# Patient Record
Sex: MALE | Race: WHITE | NOT HISPANIC OR LATINO | URBAN - METROPOLITAN AREA
[De-identification: names, ages, dates, MRNs, and addresses within clinical notes are randomized per-mention and may not be internally consistent; named-entity substitution may affect disease eponyms.]

---

## 2017-03-03 ENCOUNTER — COMMUNICATION - HEALTHEAST (OUTPATIENT)
Dept: FAMILY MEDICINE | Facility: CLINIC | Age: 42
End: 2017-03-03

## 2017-03-03 DIAGNOSIS — F43.23 ADJUSTMENT REACTION WITH ANXIETY AND DEPRESSION: ICD-10-CM

## 2017-03-27 ENCOUNTER — OFFICE VISIT - HEALTHEAST (OUTPATIENT)
Dept: FAMILY MEDICINE | Facility: CLINIC | Age: 42
End: 2017-03-27

## 2017-03-27 DIAGNOSIS — J20.9 BRONCHITIS WITH BRONCHOSPASM: ICD-10-CM

## 2017-03-27 ASSESSMENT — MIFFLIN-ST. JEOR: SCORE: 1685.04

## 2017-05-02 ENCOUNTER — COMMUNICATION - HEALTHEAST (OUTPATIENT)
Dept: FAMILY MEDICINE | Facility: CLINIC | Age: 42
End: 2017-05-02

## 2017-05-02 DIAGNOSIS — M16.0 OSTEOARTHRITIS, HIP, BILATERAL: ICD-10-CM

## 2017-07-06 ENCOUNTER — COMMUNICATION - HEALTHEAST (OUTPATIENT)
Dept: FAMILY MEDICINE | Facility: CLINIC | Age: 42
End: 2017-07-06

## 2017-07-06 DIAGNOSIS — F43.23 ADJUSTMENT REACTION WITH ANXIETY AND DEPRESSION: ICD-10-CM

## 2017-08-17 ENCOUNTER — OFFICE VISIT - HEALTHEAST (OUTPATIENT)
Dept: FAMILY MEDICINE | Facility: CLINIC | Age: 42
End: 2017-08-17

## 2017-08-17 DIAGNOSIS — F43.23 ADJUSTMENT REACTION WITH ANXIETY AND DEPRESSION: ICD-10-CM

## 2017-10-23 ENCOUNTER — COMMUNICATION - HEALTHEAST (OUTPATIENT)
Dept: FAMILY MEDICINE | Facility: CLINIC | Age: 42
End: 2017-10-23

## 2017-10-23 DIAGNOSIS — F43.23 ADJUSTMENT REACTION WITH ANXIETY AND DEPRESSION: ICD-10-CM

## 2018-04-03 ENCOUNTER — OFFICE VISIT - HEALTHEAST (OUTPATIENT)
Dept: FAMILY MEDICINE | Facility: CLINIC | Age: 43
End: 2018-04-03

## 2018-04-03 ENCOUNTER — COMMUNICATION - HEALTHEAST (OUTPATIENT)
Dept: FAMILY MEDICINE | Facility: CLINIC | Age: 43
End: 2018-04-03

## 2018-04-03 DIAGNOSIS — J01.90 ACUTE SINUSITIS, RECURRENCE NOT SPECIFIED, UNSPECIFIED LOCATION: ICD-10-CM

## 2018-04-11 ENCOUNTER — OFFICE VISIT - HEALTHEAST (OUTPATIENT)
Dept: FAMILY MEDICINE | Facility: CLINIC | Age: 43
End: 2018-04-11

## 2018-04-11 DIAGNOSIS — M16.0 OSTEOARTHRITIS OF BOTH HIPS, UNSPECIFIED OSTEOARTHRITIS TYPE: ICD-10-CM

## 2018-04-11 DIAGNOSIS — Z13.21 SCREENING FOR ENDOCRINE, NUTRITIONAL, METABOLIC AND IMMUNITY DISORDER: ICD-10-CM

## 2018-04-11 DIAGNOSIS — Z13.228 SCREENING FOR ENDOCRINE, NUTRITIONAL, METABOLIC AND IMMUNITY DISORDER: ICD-10-CM

## 2018-04-11 DIAGNOSIS — Z13.29 SCREENING FOR ENDOCRINE, NUTRITIONAL, METABOLIC AND IMMUNITY DISORDER: ICD-10-CM

## 2018-04-11 DIAGNOSIS — Z13.0 SCREENING FOR ENDOCRINE, NUTRITIONAL, METABOLIC AND IMMUNITY DISORDER: ICD-10-CM

## 2018-04-11 DIAGNOSIS — Z00.00 VISIT FOR PREVENTIVE HEALTH EXAMINATION: ICD-10-CM

## 2018-04-11 LAB
ALBUMIN SERPL-MCNC: 4.1 G/DL (ref 3.5–5)
ALP SERPL-CCNC: 72 U/L (ref 45–120)
ALT SERPL W P-5'-P-CCNC: 21 U/L (ref 0–45)
ANION GAP SERPL CALCULATED.3IONS-SCNC: 14 MMOL/L (ref 5–18)
AST SERPL W P-5'-P-CCNC: 21 U/L (ref 0–40)
BILIRUB SERPL-MCNC: 1.6 MG/DL (ref 0–1)
BUN SERPL-MCNC: 14 MG/DL (ref 8–22)
CALCIUM SERPL-MCNC: 10 MG/DL (ref 8.5–10.5)
CHLORIDE BLD-SCNC: 104 MMOL/L (ref 98–107)
CHOLEST SERPL-MCNC: 189 MG/DL
CO2 SERPL-SCNC: 24 MMOL/L (ref 22–31)
CREAT SERPL-MCNC: 0.78 MG/DL (ref 0.7–1.3)
FASTING STATUS PATIENT QL REPORTED: YES
GFR SERPL CREATININE-BSD FRML MDRD: >60 ML/MIN/1.73M2
GLUCOSE BLD-MCNC: 81 MG/DL (ref 70–125)
HDLC SERPL-MCNC: 49 MG/DL
LDLC SERPL CALC-MCNC: 124 MG/DL
POTASSIUM BLD-SCNC: 4.6 MMOL/L (ref 3.5–5)
PROT SERPL-MCNC: 7.6 G/DL (ref 6–8)
SODIUM SERPL-SCNC: 142 MMOL/L (ref 136–145)
TRIGL SERPL-MCNC: 79 MG/DL
TSH SERPL DL<=0.005 MIU/L-ACNC: 1.57 UIU/ML (ref 0.3–5)

## 2018-04-11 ASSESSMENT — MIFFLIN-ST. JEOR: SCORE: 1689.58

## 2018-04-12 ENCOUNTER — COMMUNICATION - HEALTHEAST (OUTPATIENT)
Dept: FAMILY MEDICINE | Facility: CLINIC | Age: 43
End: 2018-04-12

## 2018-04-12 LAB
25(OH)D3 SERPL-MCNC: 18.6 NG/ML (ref 30–80)
25(OH)D3 SERPL-MCNC: 18.6 NG/ML (ref 30–80)

## 2018-04-13 LAB
A ALTERNATA IGE QN: <0.35 KU/L
A FUMIGATUS IGE QN: <0.35 KU/L
C HERBARUM IGE QN: <0.35 KU/L
CAT DANDER IGG QN: <0.35 KU/L
COCKSFOOT IGE QN: <0.35 KU/L
COMMON RAGWEED IGE QN: <0.35 KU/L
COTTONWOOD IGE QN: <0.35 KU/L
D FARINAE IGE QN: <0.35 KU/L
D PTERONYSS IGE QN: <0.35 KU/L
DOG DANDER+EPITH IGE QN: <0.35 KU/L
KENT BLUE GRASS IGE QN: <0.35 KU/L
MAPLE IGE QN: <0.35 KU/L
ROACH IGE QN: <0.35 KU/L
SILVER BIRCH IGE QN: <0.35 KU/L
TIMOTHY IGE QN: <0.35 KU/L
TOTAL IGE - HISTORICAL: 18.1 KU/L (ref 0–100)
WHITE ASH IGE QN: <0.35 KU/L
WHITE ELM IGE QN: <0.35 KU/L
WHITE OAK IGE QN: <0.35 KU/L

## 2018-04-16 ENCOUNTER — AMBULATORY - HEALTHEAST (OUTPATIENT)
Dept: FAMILY MEDICINE | Facility: CLINIC | Age: 43
End: 2018-04-16

## 2018-04-16 ENCOUNTER — COMMUNICATION - HEALTHEAST (OUTPATIENT)
Dept: FAMILY MEDICINE | Facility: CLINIC | Age: 43
End: 2018-04-16

## 2018-04-16 DIAGNOSIS — J06.9 RECURRENT URI (UPPER RESPIRATORY INFECTION): ICD-10-CM

## 2018-04-16 DIAGNOSIS — Z91.09 ENVIRONMENTAL ALLERGIES: ICD-10-CM

## 2018-04-23 ENCOUNTER — OFFICE VISIT - HEALTHEAST (OUTPATIENT)
Dept: ALLERGY | Facility: CLINIC | Age: 43
End: 2018-04-23

## 2018-04-23 DIAGNOSIS — R09.81 NASAL CONGESTION: ICD-10-CM

## 2018-04-23 DIAGNOSIS — J32.9 RECURRENT SINUS INFECTIONS: ICD-10-CM

## 2018-05-22 ENCOUNTER — OFFICE VISIT - HEALTHEAST (OUTPATIENT)
Dept: OTOLARYNGOLOGY | Facility: CLINIC | Age: 43
End: 2018-05-22

## 2018-05-22 DIAGNOSIS — J32.9 CHRONIC SINUSITIS, UNSPECIFIED LOCATION: ICD-10-CM

## 2018-05-22 DIAGNOSIS — G50.1 ATYPICAL FACIAL PAIN: ICD-10-CM

## 2018-05-24 ENCOUNTER — HOSPITAL ENCOUNTER (OUTPATIENT)
Dept: CT IMAGING | Facility: CLINIC | Age: 43
Discharge: HOME | End: 2018-05-24
Attending: OTOLARYNGOLOGY

## 2018-05-24 DIAGNOSIS — J32.9 CHRONIC SINUSITIS, UNSPECIFIED LOCATION: ICD-10-CM

## 2018-05-29 ENCOUNTER — COMMUNICATION - HEALTHEAST (OUTPATIENT)
Dept: OTOLARYNGOLOGY | Facility: CLINIC | Age: 43
End: 2018-05-29

## 2018-08-27 ENCOUNTER — COMMUNICATION - HEALTHEAST (OUTPATIENT)
Dept: FAMILY MEDICINE | Facility: CLINIC | Age: 43
End: 2018-08-27

## 2018-09-05 ENCOUNTER — RECORDS - HEALTHEAST (OUTPATIENT)
Dept: ADMINISTRATIVE | Facility: OTHER | Age: 43
End: 2018-09-05

## 2018-09-12 ENCOUNTER — COMMUNICATION - HEALTHEAST (OUTPATIENT)
Dept: FAMILY MEDICINE | Facility: CLINIC | Age: 43
End: 2018-09-12

## 2018-10-04 ENCOUNTER — OFFICE VISIT - HEALTHEAST (OUTPATIENT)
Dept: FAMILY MEDICINE | Facility: CLINIC | Age: 43
End: 2018-10-04

## 2018-10-04 DIAGNOSIS — M16.0 OSTEOARTHRITIS OF BOTH HIPS, UNSPECIFIED OSTEOARTHRITIS TYPE: ICD-10-CM

## 2018-10-04 ASSESSMENT — MIFFLIN-ST. JEOR: SCORE: 1762.15

## 2018-11-07 ENCOUNTER — RECORDS - HEALTHEAST (OUTPATIENT)
Dept: ADMINISTRATIVE | Facility: OTHER | Age: 43
End: 2018-11-07

## 2018-11-20 ENCOUNTER — COMMUNICATION - HEALTHEAST (OUTPATIENT)
Dept: FAMILY MEDICINE | Facility: CLINIC | Age: 43
End: 2018-11-20

## 2018-12-06 ENCOUNTER — OFFICE VISIT - HEALTHEAST (OUTPATIENT)
Dept: FAMILY MEDICINE | Facility: CLINIC | Age: 43
End: 2018-12-06

## 2018-12-06 DIAGNOSIS — Z01.818 PREOPERATIVE EVALUATION TO RULE OUT SURGICAL CONTRAINDICATION: ICD-10-CM

## 2018-12-06 DIAGNOSIS — M16.0 OSTEOARTHRITIS OF BOTH HIPS, UNSPECIFIED OSTEOARTHRITIS TYPE: ICD-10-CM

## 2018-12-06 LAB
ALBUMIN SERPL-MCNC: 4.4 G/DL (ref 3.5–5)
ALP SERPL-CCNC: 68 U/L (ref 45–120)
ALT SERPL W P-5'-P-CCNC: 17 U/L (ref 0–45)
ANION GAP SERPL CALCULATED.3IONS-SCNC: 14 MMOL/L (ref 5–18)
AST SERPL W P-5'-P-CCNC: 15 U/L (ref 0–40)
BILIRUB SERPL-MCNC: 2.1 MG/DL (ref 0–1)
BUN SERPL-MCNC: 16 MG/DL (ref 8–22)
CALCIUM SERPL-MCNC: 10.4 MG/DL (ref 8.5–10.5)
CHLORIDE BLD-SCNC: 104 MMOL/L (ref 98–107)
CO2 SERPL-SCNC: 25 MMOL/L (ref 22–31)
CREAT SERPL-MCNC: 0.78 MG/DL (ref 0.7–1.3)
ERYTHROCYTE [DISTWIDTH] IN BLOOD BY AUTOMATED COUNT: 11.7 % (ref 11–14.5)
GFR SERPL CREATININE-BSD FRML MDRD: >60 ML/MIN/1.73M2
GLUCOSE BLD-MCNC: 90 MG/DL (ref 70–125)
HCT VFR BLD AUTO: 47.4 % (ref 40–54)
HGB BLD-MCNC: 15.7 G/DL (ref 14–18)
MCH RBC QN AUTO: 30.9 PG (ref 27–34)
MCHC RBC AUTO-ENTMCNC: 33.1 G/DL (ref 32–36)
MCV RBC AUTO: 93 FL (ref 80–100)
PLATELET # BLD AUTO: 256 THOU/UL (ref 140–440)
PMV BLD AUTO: 7.3 FL (ref 7–10)
POTASSIUM BLD-SCNC: 4.8 MMOL/L (ref 3.5–5)
PROT SERPL-MCNC: 7.4 G/DL (ref 6–8)
RBC # BLD AUTO: 5.08 MILL/UL (ref 4.4–6.2)
SODIUM SERPL-SCNC: 143 MMOL/L (ref 136–145)
WBC: 4.9 THOU/UL (ref 4–11)

## 2018-12-06 ASSESSMENT — MIFFLIN-ST. JEOR: SCORE: 1675.97

## 2019-01-02 ENCOUNTER — RECORDS - HEALTHEAST (OUTPATIENT)
Dept: ADMINISTRATIVE | Facility: OTHER | Age: 44
End: 2019-01-02

## 2019-01-14 ENCOUNTER — OFFICE VISIT - HEALTHEAST (OUTPATIENT)
Dept: FAMILY MEDICINE | Facility: CLINIC | Age: 44
End: 2019-01-14

## 2019-01-14 DIAGNOSIS — Z01.818 PREOPERATIVE EXAMINATION: ICD-10-CM

## 2019-01-14 DIAGNOSIS — M16.0 OSTEOARTHRITIS OF BOTH HIPS, UNSPECIFIED OSTEOARTHRITIS TYPE: ICD-10-CM

## 2019-01-14 LAB
ALBUMIN SERPL-MCNC: 4.2 G/DL (ref 3.5–5)
ALP SERPL-CCNC: 76 U/L (ref 45–120)
ALT SERPL W P-5'-P-CCNC: 20 U/L (ref 0–45)
ANION GAP SERPL CALCULATED.3IONS-SCNC: 8 MMOL/L (ref 5–18)
AST SERPL W P-5'-P-CCNC: 15 U/L (ref 0–40)
BASOPHILS # BLD AUTO: 0 THOU/UL (ref 0–0.2)
BASOPHILS NFR BLD AUTO: 1 % (ref 0–2)
BILIRUB SERPL-MCNC: 1.3 MG/DL (ref 0–1)
BUN SERPL-MCNC: 18 MG/DL (ref 8–22)
CALCIUM SERPL-MCNC: 9.4 MG/DL (ref 8.5–10.5)
CHLORIDE BLD-SCNC: 100 MMOL/L (ref 98–107)
CO2 SERPL-SCNC: 31 MMOL/L (ref 22–31)
CREAT SERPL-MCNC: 0.78 MG/DL (ref 0.7–1.3)
EOSINOPHIL # BLD AUTO: 0.2 THOU/UL (ref 0–0.4)
EOSINOPHIL NFR BLD AUTO: 2 % (ref 0–6)
ERYTHROCYTE [DISTWIDTH] IN BLOOD BY AUTOMATED COUNT: 12.2 % (ref 11–14.5)
GFR SERPL CREATININE-BSD FRML MDRD: >60 ML/MIN/1.73M2
GLUCOSE BLD-MCNC: 109 MG/DL (ref 70–125)
HCT VFR BLD AUTO: 40.6 % (ref 40–54)
HGB BLD-MCNC: 13.5 G/DL (ref 14–18)
LYMPHOCYTES # BLD AUTO: 1.9 THOU/UL (ref 0.8–4.4)
LYMPHOCYTES NFR BLD AUTO: 28 % (ref 20–40)
MCH RBC QN AUTO: 30.8 PG (ref 27–34)
MCHC RBC AUTO-ENTMCNC: 33.1 G/DL (ref 32–36)
MCV RBC AUTO: 93 FL (ref 80–100)
MONOCYTES # BLD AUTO: 0.4 THOU/UL (ref 0–0.9)
MONOCYTES NFR BLD AUTO: 6 % (ref 2–10)
NEUTROPHILS # BLD AUTO: 4.3 THOU/UL (ref 2–7.7)
NEUTROPHILS NFR BLD AUTO: 63 % (ref 50–70)
PLATELET # BLD AUTO: 348 THOU/UL (ref 140–440)
PMV BLD AUTO: 6.8 FL (ref 7–10)
POTASSIUM BLD-SCNC: 4.5 MMOL/L (ref 3.5–5)
PROT SERPL-MCNC: 7.2 G/DL (ref 6–8)
RBC # BLD AUTO: 4.37 MILL/UL (ref 4.4–6.2)
SODIUM SERPL-SCNC: 139 MMOL/L (ref 136–145)
WBC: 6.7 THOU/UL (ref 4–11)

## 2019-01-14 ASSESSMENT — MIFFLIN-ST. JEOR: SCORE: 1694.11

## 2019-01-29 ENCOUNTER — RECORDS - HEALTHEAST (OUTPATIENT)
Dept: ADMINISTRATIVE | Facility: OTHER | Age: 44
End: 2019-01-29

## 2019-02-12 ENCOUNTER — RECORDS - HEALTHEAST (OUTPATIENT)
Dept: ADMINISTRATIVE | Facility: OTHER | Age: 44
End: 2019-02-12

## 2019-03-13 ENCOUNTER — RECORDS - HEALTHEAST (OUTPATIENT)
Dept: ADMINISTRATIVE | Facility: OTHER | Age: 44
End: 2019-03-13

## 2019-07-04 ENCOUNTER — COMMUNICATION - HEALTHEAST (OUTPATIENT)
Dept: TELEHEALTH | Facility: CLINIC | Age: 44
End: 2019-07-04

## 2019-12-13 ENCOUNTER — RECORDS - HEALTHEAST (OUTPATIENT)
Dept: ADMINISTRATIVE | Facility: OTHER | Age: 44
End: 2019-12-13

## 2021-05-30 VITALS — WEIGHT: 168 LBS | BODY MASS INDEX: 22.75 KG/M2 | HEIGHT: 72 IN

## 2021-05-31 ENCOUNTER — RECORDS - HEALTHEAST (OUTPATIENT)
Dept: ADMINISTRATIVE | Facility: CLINIC | Age: 46
End: 2021-05-31

## 2021-06-01 VITALS — BODY MASS INDEX: 25.06 KG/M2 | WEIGHT: 185 LBS | HEIGHT: 72 IN

## 2021-06-01 VITALS — HEIGHT: 72 IN | WEIGHT: 169 LBS | BODY MASS INDEX: 22.89 KG/M2

## 2021-06-01 VITALS — BODY MASS INDEX: 23.54 KG/M2 | WEIGHT: 173.6 LBS

## 2021-06-02 VITALS — HEIGHT: 72 IN | WEIGHT: 170 LBS | BODY MASS INDEX: 23.03 KG/M2

## 2021-06-02 VITALS — WEIGHT: 166 LBS | HEIGHT: 72 IN | BODY MASS INDEX: 22.48 KG/M2

## 2021-06-09 NOTE — PROGRESS NOTES
Assessment/Plan:        Diagnoses and all orders for this visit:    Bronchitis with bronchospasm    Other orders  -     amoxicillin-clavulanate (AUGMENTIN) 500-125 mg per tablet; Take 1 tablet (500 mg total) by mouth 2 (two) times a day for 10 days.  Dispense: 20 tablet; Refill: 0  -     albuterol (PROVENTIL HFA;VENTOLIN HFA) 90 mcg/actuation inhaler; Inhale 2 puffs every 6 (six) hours as needed for wheezing.  Dispense: 1 Inhaler; Refill: 1            Subjective:    Patient ID: Andrew Benito is a 42 y.o. male.    URI    This is a new problem. The current episode started in the past 7 days. The problem has been unchanged. There has been no fever. Associated symptoms include congestion, coughing, headaches, rhinorrhea and wheezing. Pertinent negatives include no chest pain, ear pain, nausea, plugged ear sensation, rash, sinus pain, sneezing, sore throat or swollen glands. He has tried acetaminophen for the symptoms. The treatment provided no relief.       The following portions of the patient's history were reviewed and updated as appropriate: allergies, current medications, past family history, past medical history, past social history, past surgical history and problem list.    Review of Systems   Constitutional: Positive for fatigue. Negative for activity change and fever.   HENT: Positive for congestion, rhinorrhea and sinus pressure. Negative for ear discharge, ear pain, facial swelling, postnasal drip, sneezing, sore throat and trouble swallowing.    Respiratory: Positive for cough and wheezing. Negative for chest tightness.    Cardiovascular: Negative for chest pain.   Gastrointestinal: Negative for nausea.   Skin: Negative for rash.   Neurological: Positive for headaches.   All other systems reviewed and are negative.            Objective:    Physical Exam   Constitutional: He is oriented to person, place, and time. He appears well-developed and well-nourished. No distress.   HENT:   Right Ear: External  ear normal.   Left Ear: External ear normal.   Mouth/Throat: Oropharynx is clear and moist. No oropharyngeal exudate.   Nose - septum midline. Turbinates swollen   Eyes: Right eye exhibits no discharge. Left eye exhibits no discharge.   Neck: Normal range of motion. Neck supple.   Cardiovascular: Normal rate, regular rhythm and normal heart sounds.  Exam reveals no gallop and no friction rub.    No murmur heard.  Pulmonary/Chest: Effort normal. He has wheezes. He has no rales.   Rhonchi scattered throughout   Lymphadenopathy:     He has no cervical adenopathy.   Neurological: He is alert and oriented to person, place, and time.   Skin: Skin is warm and dry.   Nursing note and vitals reviewed.

## 2021-06-12 NOTE — PROGRESS NOTES
OFFICE VISIT - FAMILY MEDICINE     ASSESSMENT AND PLAN     1. Adjustment reaction with anxiety and depression  escitalopram oxalate (LEXAPRO) 20 MG tablet   Overall symptom appears stable on Lexapro 20 mg, continue with current treatment, consider tapering the medication down as patient's symptoms improve, the meantime will be continued with counseling therapy.  Hips osteoarthritis appears stable, has been seen by orthopedics in the past.  He will continue to follow with dermatologist for mole check and regular skin exam.    CHIEF COMPLAINT   Medication Education Visit (LEXAPRO); Mass (MOCLE CHECK/STSTEMIC); Referral (DERM); and Mental Health Problem (F/U)    HPI   Andrew Benito is a 42 y.o. male.  Updated MIIC    Past history includes significant osteoarthritis of bilateral hips currently being managed symptomatically, but has seen orthopedist in the past and surgery option has been discussed.  Anxiety with depression stable on medication, has been getting general and marital counseling.  Overall anxiety appears to be stable on current dose of Lexapro, he takes 20 mg daily's, he still going for general and marital counseling 2 times a week, but acknowledges that there has not been that much improvement in his marital problem.  He does have twins with his current wife.  He denies any suicidal ideation.  Has been on albuterol in the past after an upper respiratory infection, but denies any asthma symptoms.  He does have multiple moles on his skin, and planning to follow-up with Dr. Grady dermatologist.  Does have a history of early osteoarthritis of both hips, and has been seen in the past by orthopedics with plan of considering hip replacement if worsening symptoms.  But in the meantime has been managing that symptomatically with activity modification on analgesic over-the-counter.    Review of Systems As per HPI, otherwise negative.    OBJECTIVE   Pulse 60  Temp 98  F (36.7  C) (Oral)   Resp 13  Physical  Exam   Constitutional: He appears well-developed and well-nourished.   HENT:   Head: Normocephalic and atraumatic.   Cardiovascular: Normal rate, regular rhythm, normal heart sounds and intact distal pulses.  Exam reveals no gallop and no friction rub.    No murmur heard.  Pulmonary/Chest: Effort normal and breath sounds normal. No respiratory distress. He has no wheezes. He has no rales.   Musculoskeletal: He exhibits no edema or tenderness.   Psychiatric: He has a normal mood and affect. Judgment and thought content normal.       [facility]     Family History   Problem Relation Age of Onset     Alcohol abuse Mother      Depression Mother      Alcohol abuse Father      Depression Father      Hypertension Father      Depression Maternal Aunt      Early death Maternal Uncle      Arthritis Maternal Grandmother      Cancer Maternal Grandmother      Depression Maternal Grandmother      Alcohol abuse Maternal Grandfather      Arthritis Maternal Grandfather      Diabetes Maternal Grandfather      Hearing loss Maternal Grandfather      Hypertension Maternal Grandfather      Stroke Maternal Grandfather      Cancer Paternal Grandmother      Early death Paternal Grandmother      Alcohol abuse Paternal Grandfather      Hearing loss Paternal Grandfather      Heart disease Paternal Grandfather      Hypertension Paternal Grandfather      Stroke Paternal Grandfather      Social History     Social History     Marital status:      Spouse name: N/A     Number of children: N/A     Years of education: N/A     Occupational History     Not on file.     Social History Main Topics     Smoking status: Current Some Day Smoker     Smokeless tobacco: Never Used     Alcohol use Yes      Comment: Very rarely     Drug use: Not on file     Sexual activity: Not on file     Other Topics Concern     Not on file     Social History Narrative       [facility]     Patient Active Problem List    Diagnosis Date Noted     Adjustment reaction with anxiety and  depression 04/13/2015     Marital or partner relational problem 04/13/2015     Osteoarthritis of hips, bilateral      Past Surgical History:   Procedure Laterality Date     [place] REPAIR CRUCIATE LIGAMENT,KNEE      Description: Primary Repair Of Knee Ligament Cruciate Anterior;  Recorded: 10/12/2011;     [place] REPAIR OF NASAL SEPTUM      Description: Septoplasty;  Recorded: 10/12/2011;       RESULTS/CONSULTS (Lab/Rad)   No results found for this or any previous visit (from the past 168 hour(s)).  No results found.  MEDICATIONS     Current Outpatient Prescriptions on File Prior to Visit   Medication Sig Dispense Refill     albuterol (PROVENTIL HFA;VENTOLIN HFA) 90 mcg/actuation inhaler Inhale 2 puffs every 6 (six) hours as needed for wheezing. 1 Inhaler 1     No current facility-administered medications on file prior to visit.        HEALTH MAINTENANCE / SCREENING   PHQ-2 Total Score: 3 (8/17/2017 10:00 AM), PHQ-9 Total Score: 10 (8/17/2017 10:00 AM),PRIYANK-7 Total: 6 (8/17/2017 10:00 AM)  Immunization History   Administered Date(s) Administered     DT (pediatric) 01/01/2001     Hep A, historic 02/17/2009, 02/09/2011     Influenza S0j6-16, 03/02/2010     Influenza, Live, Nasal LAIV3 10/23/2012     Influenza, inj, historic 03/02/2010     Influenza, seasonal,quad inj 36+ mos 09/21/2015     Influenza, seasonal,quad inj 6-35 mos 10/12/2011, 02/06/2014     Tdap 11/09/2007, 09/16/2016     Typhoid, ViCPs 11/13/2006     Health Maintenance   Topic     ADVANCE DIRECTIVES DISCUSSED WITH PATIENT      TDAP ADULT ONE TIME DOSE      TD 18+ HE      INFLUENZA VACCINE RULE BASED      The following are part of a depression follow up plan for the patient:  under care of mental health counselor  Saurabh Parra MD  Family Medicine, [facility]   This transcription uses voice recognition software, which may contain typographical errors.

## 2021-06-17 NOTE — PROGRESS NOTES
Assessment:    Recurrent sinus infections  No found specific allergy sensitivity today.  Likely allergic rhinitis is not a significant component of this.    Plan:    Daily nasal saline washes  Consider using fluticasone nasal spray throughout the winter season.  Consider review by ENT   ____________________________________________________________________________     Andrew comes in today with symptoms of chronic nasal congestion and facial pressure.  He reports having one significant sinus infection per year.  Often he will get placed on antibiotics which seem to help significantly.  He reports that if he feels a sinus infection coming on.  He concentrates on getting good sleep and good hydration and sometimes this seems to help.  He reports significant increase in symptoms over the past 6 months however generally speaking there is not been a clear seasonal relationship with his symptoms.  No other obvious trigger.  He recalls having initial sinus infection about 8 years ago.  At that time was diagnosed with polyps.  He does recall having surgery at that time which helped however since that time he has had chronic symptoms.  He reports a normal sense of taste and smell.  He does use nasal saline irrigations.  Occasionally will use Afrin but is not using it daily.    Review of symptoms:  As above, otherwise negative    Past medical history: Bilateral osteoarthritis of his hips.  Nasal surgery as above.    Allergies: No known allergies to latex, foods or hymenoptera venom.  Report of allergy to sulfa antibiotics since childhood    Social history: Currently lives in house is built in the 1950s.  He has been in this house since 2014.  It has forced air heat.  No visible water seepage or mold in the home.  His current dog has been in the home since 2002.  He is an occasional social cigarette smoker.  He does work as an audio  in an office based setting.    Medications: reviewed in chart    Physical Exam:   General:  Alert and in no apparent distress.  Eyes:  Sclera clear.  Ears: TMs translucent grey with bony landmarks visible. Nose: Pale, boggy mucosal membranes.  Throat: Pink, moist.  No lesions.  Neck: Supple.  No lymphadenopathy.  Lungs: CTA.  CV: Regular rate and rhythm. Extremities: Well perfused.  No clubbing or cyanosis. Skin: No rash    Last Percutaneous Allergy Test Results  Trees  Steve, White  1:20 H  (W/F in mm): 0 (04/23/18 1520)  Birch Mix 1:20 H (W/F in mm): 0 (04/23/18 1520)  [place], Common 1:20 H (W/F in mm): 0 (04/23/18 1520)  Elm, American 1:20 H (W/F in mm): 0 (04/23/18 1520)  [place], Shagbark 1:20 H (W/F in mm): 0 (04/23/18 1520)  Maple, Hard/Sugar 1:20 H (W/F in mm): 0 (04/23/18 1520)  [place] Mix 1:20 H (W/F in mm): 0 (04/23/18 1520)  [place], Red 1:20 H (W/F in mm): 0 (04/23/18 1520)  [place], American 1:20 H (W/F in mm): 0 (04/23/18 1520)  [place] Tree 1:20 H (W/F in mm): 0 (04/23/18 1520)  Dust Mites  D. Pteronyssinus Mite 30,000 AU/ML H (W/F in mm): 0 (04/23/18 1520)  D. Farinae Mite 30,000 AU/ML H (W/F in mm: 0 (04/23/18 1520)  Grasses  Grass Mix #4 10,000 BAU/ML H: 0 (04/23/18 1520)  Manjit Grass 1:20 H (W/F in mm): 0 (04/23/18 1520)  Cockroach  Cockroach Mix 1:10 H (W/F in mm): 0 (04/23/18 1520)  Molds/Fungi  Alternaria Tenuis 1:10 H (W/F in mm): 0 (04/23/18 1520)  Aspergillus Fumigatus 1:10 H (W/F in mm): 0 (04/23/18 1520)  Homodendrum Cladosporioides 1:10 H (W/F in mm): 0 (04/23/18 1520)  Penicillin Notatum 1:10 H (W/F in mm): 0 (04/23/18 1520)  Epicoccum 1:10 H (W/F in mm): 0 (04/23/18 1520)  Weeds  Ragweed, Short 1:20 H (W/F in mm): 0 (04/23/18 1520)  Dock, [place] 1:20 H (W/F in mm): 0 (04/23/18 1520)  Lamb's Quarter 1:20 H (W/F in mm): 0 (04/23/18 1520)  Pigweed, Rough Red Root 1:20 H  (W/F in mm): 0 (04/23/18 1520)  Plantain, English 1:20 H  (W/F in mm): 0 (04/23/18 1520)  Sagebrush, Mugwort 1:20 H  (W/F in mm): 0 (04/23/18 1520)  Animal  Cat 10,000 BAU/ML H (W/F in mm): 0  (04/23/18 1520)  Dog 1:10 H (W/F in mm): 0 (04/23/18 1520)  Controls  Device Type: QUINTIP (04/23/18 1520)  Neg. control: 50% Glycerine/Saline H (W/F in mm): 0 (04/23/18 1520)  Pos. control: Histamine 6mg/ML (W/F in mms): 7/17 (04/23/18 1520)

## 2021-06-17 NOTE — PROGRESS NOTES
Assessment:      Healthy male exam.        Plan:     1.  Osteoarthritis of both hips with chronic pain, we discussed about management with physical therapy, medication included gabapentin or similar, we also discussed about surgery option.  Also concerned about recurrent sinus infection from possible inhalant allergy, IgA allergy panel pending, if normal with a referral allergies.  Anxiety, Lexapro seems to be causing more nausea at current dose of 20 mg daily, and has not seen his any significant benefit, will decrease to 10 mg daily and possible switch to a different agent.  2. Patient Counseling:  --Nutrition: Stressed importance of moderation in sodium/caffeine intake, saturated fat and cholesterol, caloric balance, sufficient intake of fresh fruits, vegetables, fiber, calcium, iron.  --Discussed the issue of calcium supplement, and the daily use of baby aspirin.  --Exercise: Stressed the importance of regular exercise.   --Substance Abuse: Discussed cessation/primary prevention of tobacco, alcohol, or other drug use; driving or other dangerous activities under the influence; availability of treatment for abuse.    --Sexuality: Discussed sexually transmitted diseases, partner selection, use of condoms, avoidance of unintended pregnancy.  --Injury prevention: Discussed safety belts, safety helmets, smoke detector, smoking near bedding or upholstery.   --Dental health: Discussed importance of regular tooth brushing, flossing, and dental visits.  --Immunizations reviewed.  --Discussed timing and benefits of screening colonoscopy and alternative options.  --After hours service discussed with patient             -- The following are part of a depression follow up plan for the patient:  coping support assessment, coping support management, emotional support assessment, emotional support education and emotional support management    3. Discussed the patient's BMI with him.  The BMI is in the acceptable range  4.  Follow up as needed for acute illness     Subjective:      Andrew Benito is a 43 y.o. male who presents for an annual exam. The patient reports that there is not domestic violence in his life.   Chronic hip osteoarthritis, has seen orthopedics in the past, treatment options were discussed with him, pain seems to be leaving more pronounced lately, patient has stopped playing soccer.  Recurrent sinus infection from possible allergies, he is inquiring about getting some allergy testing.  Lexapro 20 mg causing nausea, unable to tolerate daily medication.  Healthy Habits:   Regular Exercise: Yes  Sunscreen Use: Yes  Healthy Diet: Yes  Dental Visits Regularly: Yes  Seat Belt: Yes  Sexually active: Yes  Monthly Self Testicular Exams:  Yes  Hemoccults: N/A  Flex Sig: N/A  Colonoscopy: N/A  Lipid Profile: Yes  Glucose Screen: Yes  Prevention of Osteoporosis: Yes  Last Dexa: N/A  Guns at Home:  N/A      Immunization History   Administered Date(s) Administered     DT (pediatric) 01/01/2001     Hep A, historic 02/17/2009, 02/09/2011     Hepatitis A, Peds, Unspecified 11/13/2006     Influenza R8x3-97, 03/02/2010     Influenza, Live, Nasal LAIV3 10/23/2012     Influenza, inj, historic,unspecified 03/02/2010     Influenza, seasonal,quad inj 36+ mos 09/21/2015     Influenza, seasonal,quad inj 6-35 mos 10/12/2011, 02/06/2014     Tdap 11/09/2007, 09/16/2016     Typhoid, ViCPs 11/13/2006     Immunization status: stated as current, but no records available.  Vision Screening:both eyes  Hearing: PASS     Current Outpatient Prescriptions   Medication Sig Dispense Refill     albuterol (PROVENTIL HFA;VENTOLIN HFA) 90 mcg/actuation inhaler Inhale 2 puffs every 6 (six) hours as needed for wheezing. 1 Inhaler 1     amoxicillin-clavulanate (AUGMENTIN) 875-125 mg per tablet Take 1 tablet by mouth 2 (two) times a day for 10 days. 20 tablet 0     escitalopram oxalate (LEXAPRO) 20 MG tablet Take 1 tablet (20 mg total) by mouth daily. 90  tablet 2     ergocalciferol (ERGOCALCIFEROL) 50,000 unit capsule Take 1 capsule (50,000 Units total) by mouth once a week for 12 doses. 12 capsule 1     No current facility-administered medications for this visit.      No past medical history on file.  Past Surgical History:   Procedure Laterality Date     [place] REPAIR CRUCIATE LIGAMENT,KNEE      Description: Primary Repair Of Knee Ligament Cruciate Anterior;  Recorded: 10/12/2011;     [place] REPAIR OF NASAL SEPTUM      Description: Septoplasty;  Recorded: 10/12/2011;     Sulfa (sulfonamide antibiotics)  Family History   Problem Relation Age of Onset     Alcohol abuse Mother      Depression Mother      Alcohol abuse Father      Depression Father      Hypertension Father      Depression Maternal Aunt      Early death Maternal Uncle      Arthritis Maternal Grandmother      Cancer Maternal Grandmother      Depression Maternal Grandmother      Alcohol abuse Maternal Grandfather      Arthritis Maternal Grandfather      Diabetes Maternal Grandfather      Hearing loss Maternal Grandfather      Hypertension Maternal Grandfather      Stroke Maternal Grandfather      Cancer Paternal Grandmother      Early death Paternal Grandmother      Alcohol abuse Paternal Grandfather      Hearing loss Paternal Grandfather      Heart disease Paternal Grandfather      Hypertension Paternal Grandfather      Stroke Paternal Grandfather      Social History     Social History     Marital status:      Spouse name: N/A     Number of children: N/A     Years of education: N/A     Occupational History     Not on file.     Social History Main Topics     Smoking status: Current Some Day Smoker     Smokeless tobacco: Never Used     Alcohol use Yes      Comment: Very rarely     Drug use: Not on file     Sexual activity: Not on file     Other Topics Concern     Not on file     Social History Narrative     No specialty comments available.  Review of Systems  General:  Denies problem  Eyes: Denies  problem  Ears/Nose/Throat: Denies problem  Cardiovascular: Denies problem  Respiratory:  Denies problem  Gastrointestinal:  Denies problem  Genitourinary: Denies problem  Musculoskeletal:  Denies problem  Skin: Denies problem  Neurologic: Denies problem  Psychiatric: Denies problem  Endocrine: Denies problem  Heme/Lymphatic: Denies problem   Allergic/Immunologic: Denies problem        Objective:     Vitals:    04/11/18 0749   BP: 102/62   Weight: 169 lb (76.7 kg)   Height: 6' (1.829 m)     Body mass index is 22.92 kg/(m^2).    Physical  General Appearance: Alert, cooperative, no distress, appears stated age  Head: Normocephalic, without obvious abnormality, atraumatic  Eyes: PERRL, conjunctiva/corneas clear, EOM's intact  Ears: Normal TM's and external ear canals, both ears  Nose: Nares normal, septum midline,mucosa normal, no drainage  Throat: Lips, mucosa, and tongue normal; teeth and gums normal  Neck: Supple, symmetrical, trachea midline, no adenopathy;  thyroid: not enlarged, symmetric, no tenderness/mass/nodules; no carotid bruit or JVD  Back: Symmetric, no curvature, ROM normal, no CVA tenderness  Lungs: Clear to auscultation bilaterally, respirations unlabored  Heart: Regular rate and rhythm, S1 and S2 normal, no murmur, rub, or gallop,  Abdomen: Soft, non-tender, bowel sounds active all four quadrants,  no masses, no organomegaly  Genitourinary: Penis normal. Right and left testis are descended.No palpable masses.   Rectal: No visible external lesion  Musculoskeletal: Limited range of motion of both hips, otherwise negative musculoskeletal system has a normal range of motion. No joint swelling or deformity.   Extremities: Extremities normal, atraumatic, no cyanosis or edema  Skin: Skin color, texture, turgor normal, no rashes or lesions  Lymph nodes: Cervical, supraclavicular, and axillary nodes normal  Neurologic: He is alert. He has normal reflexes.   Psychiatric: He has a normal mood and affect.         MD Andrew Taveras was seen today for annual exam.    Diagnoses and all orders for this visit:    Visit for preventive health examination    Osteoarthritis of both hips, unspecified osteoarthritis type    Screening for endocrine, nutritional, metabolic and immunity disorder  -     Comprehensive Metabolic Panel  -     Thyroid Stimulating Hormone (TSH)  -     Lipid Cascade FASTING  -     Vitamin D, Total (25-Hydroxy)  -     IgE Allergen Panel Respiratory with Total IgE ($$$)

## 2021-06-17 NOTE — PATIENT INSTRUCTIONS - HE
Patient Instructions by Saurabh Parra MD at 1/14/2019  1:00 PM     Author: Saurabh Parra MD Service: -- Author Type: Physician    Filed: 1/15/2019  4:49 PM Encounter Date: 1/14/2019 Status: Signed    : Saurabh Parra MD (Physician)       Patient Education     After Hip Replacement: Continuing with Hospital Recovery  Once you have been shown how to protect your hip, you will learn the skills needed to return to normal life. Youll be taught how to walk, sit, and dress. To make moving easier, ask for pain medicines before each training session.  Sitting and dressing    To protect your new hip, an occupational therapist or physical therapist will teach you safer ways of doing daily tasks. Use the following tips when sitting, dressing, or using stairs.    To sit, back up until the edge of the chair touches your leg. Then, using the armrests to support your weight, lower yourself into the seat. Always keep your operated leg out in front.    To pull on socks and shoes, use a long-handled device, such as a grasper or hook. Try this with slip-on shoes first.    To wash your feet and legs, use a long-handled sponge and a shower hose.    To use stairs, step up first with your good leg. Then bring your operated leg up to meet it. When going down, step down first with your operated leg.  Going home  You can leave the hospital when your health problem is stable and youre able to walk safely, including up and down stairs. Once home, its normal to have good and bad days. But if you continue exercising, there will be more good days and your general condition is likely to improve.     Your familys role  Your familys support is especially important while you recover and readjust. They can help you make the transition to your home environment. They can remind you of what you learned in the hospital. They can also assist you with equipment until you can use it on your own. Ask your family to  encourage you to do things for yourself. They can also cheer you on and celebrate when you walk a little farther, or accomplish a new task.   Planning your discharge  A discharge planner or other healthcare worker meets with you before youre discharged to arrange for care in another setting or for special equipment you may need at home. A visit with your surgeon in a few weeks will be arranged, as well as home therapy if its needed.     When to call your healthcare provider   Contact your healthcare provider right away if you have:     Excessive pain    Infection, excessive swelling, redness, warmth, or drainage from your incision    A fever of 100.4 F (38 C) or higher, or as directed by your healthcare provider    Calf pain or swelling    Sudden onset of chest pain or shortness of breath   Date Last Reviewed: 1/1/2018 2000-2017 [employer]. [address] [place] [address]. All rights reserved. This information is not intended as a substitute for professional medical care. Always follow your healthcare professional's instructions.

## 2021-06-17 NOTE — PROGRESS NOTES
Assessment:   The encounter diagnosis was Acute sinusitis, recurrence not specified, unspecified location.     Plan:     Medications Ordered   Medications     amoxicillin-clavulanate (AUGMENTIN) 875-125 mg per tablet     Sig: Take 1 tablet by mouth 2 (two) times a day for 10 days.     Dispense:  20 tablet     Refill:  0     Patient Instructions     You may want to try a nasal lavage (also known as nasal irrigation). You can find over-the-counter products, such as Neti-Pot, at retail locations or make your own at home. Instructions for homemade nasal lavage and more information on the process are available online at http://www.aafp.org/afp/2009/1115/p1121.html.  Based on the information that you have provided, I have placed an order for you to start treatment.  View your full visit summary for details. Click on the link below to access your visit summary.    Your pharmacist will address any questions you may have about taking the medication.    Return for further follow up if needed. Call [phone]) or schedule an appointment via [employer]..    Subjective:   Andrew Benito is a 43 y.o. male who submitted an eVisit request for evaluation of his Sinus Problem.  See the questionnaire and message section of encounter report for information related to history of present illness and review of systems.    The following portions of the patient's history were reviewed and updated as appropriate:  He  does not have any pertinent problems on file.  He is allergic to sulfa (sulfonamide antibiotics)..     Objective:   No exam performed today, patient submitted as eVisit.

## 2021-06-18 NOTE — PROGRESS NOTES
HPI: This patient is a 42yo M who presents for evaluation of the sinuses at the request of Dr. Parra. The patient reports a near constant sinus infection this winter, the symptoms of which are head pressure, ear pressure, dental sensitivity, and some nasal congestion. He did have some thick mucus at times during this period, but no episodes of high fever, localized sinus pain, or pururlent drainage. Has not used any nasasl steroid sprays; did netipot a few times. He has a history of bad grinding/clenching in his 20s-early 30s, but states that he doesn't do that anymore; does not wear a bite guard. Not currently experiencing the headache symptoms.     Past medical history, surgical history, social history, family history, medications, and allergies have been reviewed with the patient and are documented above.    Review of Systems: a 10-system review was performed. Pertinent positives are noted in the HPI and on a separate scanned document in the chart.    PHYSICAL EXAMINATION:  GEN: no acute distress, normocephalic  EYES: extraocular movements are intact, pupils are equal and round. Sclera clear.   EARS: auricles are normally formed. The external auditory canals are clear with minimal to no cerumen. Tympanic membranes are intact bilaterally with no signs of infection, effusion, retractions, or perforations.  NOSE: anterior nares are patent. There are no masses or lesions. The septum is non-obstructing. No polyps, purulence, or percussive tenderness  OC/OP: clear, dentition is in good repair but with flattening and wear facets. The tongue and palate are fully mobile and symmetric. The floor of mouth, base of tongue are symmetric.  HP/L (mirror): BOT, vallecula, epiglottis normal. B TVCs fully mobile and normal in appearance.  NECK: soft and supple. No lymphadenopathy or masses. Airway is midline. TMJs nontender today  NEURO: CN VII symmetric. alert and oriented. No spontaneous nystagmus. Gait is normal.  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 Discussed that only 3% of people with \"sinus headache\" actually have any sinus pathology to explain headache and that the other 97% are headache or TMJ. Given his exam findings and history, he likely falls into the TMJ category. However, he has been diagnosed with acute sinusitis a few times and describes some drainage that makes a CT of the sinuses a reasonable step to take. Will order and call him with the results.     "

## 2021-06-20 NOTE — PROGRESS NOTES
"OFFICE VISIT - FAMILY MEDICINE     ASSESSMENT AND PLAN     1. Osteoarthritis of both hips, unspecified osteoarthritis type     Osteoarthritis of both hips, progressively getting worse over time, affecting the patient quality of life, included tying his shoes, putting his socks etc. different treatment option were discussed with the patient, and he has already tried pretty much all of them included physical therapy, steroid injection in both hips etc. patient is leaning toward surgery but is aware that most hip replacement joint surgery will last about 10-15 years at maximum, that he might need a revision later in life, is also aware that if he get a hip replacement surgery there will be some activities that he will not be able to practice like running or soccer etc. we did review again risk and benefit and again patient is leaning toward getting surgery he will be getting a second opinion by consulting with an orthopedic surgeon [facility] and follow-up with us as needed.  Than 50% of this 30-minute visit was spent in counseling and coordination of care.  CHIEF COMPLAINT   Hip Pain (\"TOTAL HIP ARTHROPLASTY, DISCUSS OPTIONS\")    IVELISSE Benito is a 43 y.o. male.  Past history includes significant osteoarthritis of bilateral hips currently being managed symptomatically, but has seen orthopedist in the past and surgery option has been discussed.  Anxiety with depression stable on medication, has been getting general and marital counseling.    Patient just recently got a steroid injection in the hip, but has not seen any significant improvement, he is a to discuss today about surgical option.  Pain is not terrible but is constant, limiting his ability to tie his shoes, to wear his socks in the morning, patient has stopped practicing soccer and other sports that involve running several years ago.  Has already seen the orthopedic surgeon in the past Dr. Jackson, discussion about benefit and risks of surgery " were discussed with him.  Initially left hip was hurting more than right but lately both hips seems to be painful and affecting his quality of life.      Review of Systems As per HPI, otherwise negative.    OBJECTIVE   /60 (Patient Site: Right Arm)  Pulse 64  Temp 98.9  F (37.2  C) (Oral)   Resp 12  Ht 6' (1.829 m)  Wt 185 lb (83.9 kg)  BMI 25.09 kg/m2  Physical Exam  Not done   [facility]     Family History   Problem Relation Age of Onset     Alcohol abuse Mother      Depression Mother      Alcohol abuse Father      Depression Father      Hypertension Father      Depression Maternal Aunt      Early death Maternal Uncle      Arthritis Maternal Grandmother      Cancer Maternal Grandmother      Depression Maternal Grandmother      Alcohol abuse Maternal Grandfather      Arthritis Maternal Grandfather      Diabetes Maternal Grandfather      Hearing loss Maternal Grandfather      Hypertension Maternal Grandfather      Stroke Maternal Grandfather      Cancer Paternal Grandmother      Early death Paternal Grandmother      Alcohol abuse Paternal Grandfather      Hearing loss Paternal Grandfather      Heart disease Paternal Grandfather      Hypertension Paternal Grandfather      Stroke Paternal Grandfather      Social History     Social History     Marital status:      Spouse name: N/A     Number of children: N/A     Years of education: N/A     Occupational History     Not on file.     Social History Main Topics     Smoking status: Current Some Day Smoker     Smokeless tobacco: Never Used      Comment: 1 cig every 10 days     Alcohol use Yes      Comment: Very rarely     Drug use: Not on file     Sexual activity: Not on file     Other Topics Concern     Not on file     Social History Narrative     Relevant history was reviewed with the patient today, unless noted in HPI, nothing is pertinent for this visit.  [facility]     Patient Active Problem List    Diagnosis Date Noted     Osteoarthritis of hips, bilateral       Past Surgical History:   Procedure Laterality Date     SC REPAIR CRUCIATE LIGAMENT,KNEE      Description: Primary Repair Of Knee Ligament Cruciate Anterior;  Recorded: 10/12/2011;     SC REPAIR OF NASAL SEPTUM      Description: Septoplasty;  Recorded: 10/12/2011;       RESULTS/CONSULTS (Lab/Rad)   No results found for this or any previous visit (from the past 168 hour(s)).  No results found.  MEDICATIONS     Current Outpatient Prescriptions on File Prior to Visit   Medication Sig Dispense Refill     albuterol (PROVENTIL HFA;VENTOLIN HFA) 90 mcg/actuation inhaler Inhale 2 puffs every 6 (six) hours as needed for wheezing. 1 Inhaler 1     [DISCONTINUED] escitalopram oxalate (LEXAPRO) 20 MG tablet Take 1 tablet (20 mg total) by mouth daily. 90 tablet 2     No current facility-administered medications on file prior to visit.        HEALTH MAINTENANCE / SCREENING   PHQ-2 Total Score: 2 (4/11/2018  9:00 AM), PHQ-9 Total Score: 5 (4/11/2018  9:00 AM),PRIYANK-7 Total: 4 (4/11/2018  9:00 AM)  Immunization History   Administered Date(s) Administered     DT (pediatric) 01/01/2001     Hep A, historic 02/17/2009, 02/09/2011     Hepatitis A, Peds, Unspecified 11/13/2006     Influenza [id number], 03/02/2010     Influenza, Live, Nasal LAIV3 10/23/2012     Influenza, inj, historic,unspecified 03/02/2010     Influenza, seasonal,quad inj 36+ mos 09/21/2015     Influenza, seasonal,quad inj 6-35 mos 10/12/2011, 02/06/2014     Tdap 11/09/2007, 09/16/2016     Typhoid, ViCPs 11/13/2006     Health Maintenance   Topic     ADVANCE DIRECTIVES DISCUSSED WITH PATIENT      TD 18+ HE      TDAP ADULT ONE TIME DOSE      INFLUENZA VACCINE RULE BASED        Saurabh Parra MD  Family Medicine, [facility]     This note was dictated using a voice recognition software.  Any grammatical or context distortion are unintentional and inherent to the software.

## 2021-06-23 NOTE — PROGRESS NOTES
Preoperative Exam    Scheduled Procedure: Total R hip replacement  Surgery Date:  1/30/19  Surgery Location: [facility]    Surgeon:  Dr. Mauricio    Assessment/Plan:     1. Preoperative examination  - HM1(CBC and Differential)  - Comprehensive Metabolic Panel  - HM1 (CBC with Diff)  2. Osteoarthritis of both hips, unspecified osteoarthritis type  Scheduled for right hip arthroplasty.    Surgical Procedure Risk: Low (reported cardiac risk generally < 1%)  Have you had prior anesthesia?: Yes  Have you or any family members had a previous anesthesia reaction:  No  Do you or any family members have a history of a clotting or bleeding disorder?: No  Cardiac Risk Assessment: no increased risk for major cardiac complications    Reviewed risks (not limited to bleeding,infection,pain,un-anticipated response to anesthesia ecc) and benefits (diagnostic and therapeutic) of surgery with patient, he understands the risks of the procedure and would like to proceed.  Patient's active problems diagnostically and therapeutically optimized for planned procedure with, Local or General anesthesia    Functional Status: Independent  Patient plans to recover at home with family.     Subjective:      Andrew Benito is a 43 y.o. male who presents for a preoperative consultation.  Advanced degenerative joint disease of both hips, failed conservative management, status post left knee replacement about a month ago, overall recovery has been very good, minimal discomfort and pain, scheduled for right hip arthroplasty.  Mild anemia secondary to left hip surgery, but stable.    All other systems reviewed and are negative, other than those listed in the HPI.    Pertinent History  Do you have difficulty breathing or chest pain after walking up a flight of stairs: No  History of obstructive sleep apnea: No  Steroid use in the last 6 months: Yes: Hip steroid injection several months ago.  Frequent Aspirin/NSAID  use: Yes: Currently taking aspirin, patient will check with surgeon to see if he can continue with that.  Prior Blood Transfusion: No  Prior Blood Transfusion Reaction: No  If for some reason prior to, during or after the procedure, if it is medically indicated, would you be willing to have a blood transfusion?:  There is no transfusion refusal.    Current Outpatient Medications   Medication Sig Dispense Refill     albuterol (PROVENTIL HFA;VENTOLIN HFA) 90 mcg/actuation inhaler Inhale 2 puffs every 6 (six) hours as needed for wheezing. 1 Inhaler 1     aspirin 325 MG EC tablet Take 325 mg by mouth 2 (two) times a day.  0     celecoxib (CELEBREX) 200 MG capsule TK ONE C PO BID FOR 10 DAYS THEN 1 QD  0     ferrous sulfate (IRON) 15 mg iron (75 mg)/mL drops Take 15 mg of iron by mouth daily.       No current facility-administered medications for this visit.         Allergies   Allergen Reactions     Sulfa (Sulfonamide Antibiotics)        Patient Active Problem List   Diagnosis     Osteoarthritis of hips, bilateral       No past medical history on file.    Past Surgical History:   Procedure Laterality Date     IN REPAIR CRUCIATE LIGAMENT,KNEE      Description: Primary Repair Of Knee Ligament Cruciate Anterior;  Recorded: 10/12/2011;     IN REPAIR OF NASAL SEPTUM      Description: Septoplasty;  Recorded: 10/12/2011;       Social History     Socioeconomic History     Marital status:      Spouse name: Not on file     Number of children: Not on file     Years of education: Not on file     Highest education level: Not on file   Social Needs     Financial resource strain: Not on file     Food insecurity - worry: Not on file     Food insecurity - inability: Not on file     Transportation needs - medical: Not on file     Transportation needs - non-medical: Not on file   Occupational History     Not on file   Tobacco Use     Smoking status: Current Some Day Smoker     Smokeless tobacco: Never Used     Tobacco comment: 1  cig every 10 days   Substance and Sexual Activity     Alcohol use: Yes     Comment: Very rarely     Drug use: Not on file     Sexual activity: Not on file   Other Topics Concern     Not on file   Social History Narrative     Not on file       Patient Care Team:  Saurabh Parra MD as PCP - General (Family Medicine)          Objective:     Vitals:    01/14/19 1252   BP: 110/60   Pulse: 64   Temp: 98.3  F (36.8  C)   TempSrc: Oral   SpO2: 98%   Weight: 170 lb (77.1 kg)   Height: 6' (1.829 m)         Physical Exam:  Physical Examination: General appearance - alert, well appearing, and in no distress  Mental status - alert, oriented to person, place, and time  Eyes - pupils equal and reactive, extraocular eye movements intact  Ears - bilateral TM's and external ear canals normal  Nose - normal and patent, no erythema, discharge or polyps  Mouth - mucous membranes moist, pharynx normal without lesions  Neck - supple, no significant adenopathy  Lymphatics - no palpable lymphadenopathy, no hepatosplenomegaly  Chest - clear to auscultation, no wheezes, rales or rhonchi, symmetric air entry  Heart - normal rate, regular rhythm, normal S1, S2, no murmurs, rubs, clicks or gallops  Abdomen - soft, nontender, nondistended, no masses or organomegaly  Back exam - full range of motion, no tenderness, palpable spasm or pain on motion  Neurological - alert, oriented, normal speech, no focal findings or movement disorder noted  Musculoskeletal - abnormal exam of right hip, abnormal active range of motion of hip, abnormal passive range of motion of hip  Extremities - peripheral pulses normal, no pedal edema, no clubbing or cyanosis  Skin - normal coloration and turgor, no rashes, no suspicious skin lesions noted    There are no Patient Instructions on file for this visit.      Labs:  Recent Results (from the past 48 hour(s))   Comprehensive Metabolic Panel    Collection Time: 01/14/19  1:20 PM   Result Value Ref Range     Sodium 139 136 - 145 mmol/L    Potassium 4.5 3.5 - 5.0 mmol/L    Chloride 100 98 - 107 mmol/L    CO2 31 22 - 31 mmol/L    Anion Gap, Calculation 8 5 - 18 mmol/L    Glucose 109 70 - 125 mg/dL    BUN 18 8 - 22 mg/dL    Creatinine 0.78 0.70 - 1.30 mg/dL    GFR MDRD Af Amer >60 >60 mL/min/1.73m2    GFR MDRD Non Af Amer >60 >60 mL/min/1.73m2    Bilirubin, Total 1.3 (H) 0.0 - 1.0 mg/dL    Calcium 9.4 8.5 - 10.5 mg/dL    Protein, Total 7.2 6.0 - 8.0 g/dL    Albumin 4.2 3.5 - 5.0 g/dL    Alkaline Phosphatase 76 45 - 120 U/L    AST 15 0 - 40 U/L    ALT 20 0 - 45 U/L   HM1 (CBC with Diff)    Collection Time: 01/14/19  1:20 PM   Result Value Ref Range    WBC 6.7 4.0 - 11.0 thou/uL    RBC 4.37 (L) 4.40 - 6.20 mill/uL    Hemoglobin 13.5 (L) 14.0 - 18.0 g/dL    Hematocrit 40.6 40.0 - 54.0 %    MCV 93 80 - 100 fL    MCH 30.8 27.0 - 34.0 pg    MCHC 33.1 32.0 - 36.0 g/dL    RDW 12.2 11.0 - 14.5 %    Platelets 348 140 - 440 thou/uL    MPV 6.8 (L) 7.0 - 10.0 fL    Neutrophils % 63 50 - 70 %    Lymphocytes % 28 20 - 40 %    Monocytes % 6 2 - 10 %    Eosinophils % 2 0 - 6 %    Basophils % 1 0 - 2 %    Neutrophils Absolute 4.3 2.0 - 7.7 thou/uL    Lymphocytes Absolute 1.9 0.8 - 4.4 thou/uL    Monocytes Absolute 0.4 0.0 - 0.9 thou/uL    Eosinophils Absolute 0.2 0.0 - 0.4 thou/uL    Basophils Absolute 0.0 0.0 - 0.2 thou/uL        Immunization History   Administered Date(s) Administered     DT (pediatric) 01/01/2001     Hep A, Adult IM (19yr & older) 11/13/2006     Hep A, historic 02/17/2009, 02/09/2011     Hepatitis A, Peds, Unspecified 11/13/2006     Influenza [id number], 03/02/2010     Influenza, Live, Nasal LAIV3 10/23/2012     Influenza, inj, historic,unspecified 03/02/2010     Influenza, seasonal,quad inj 36+ mos 09/21/2015     Influenza, seasonal,quad inj 6-35 mos 10/12/2011, 02/06/2014     Td, Adult, Absorbed 12/09/1997     Tdap 11/09/2007, 09/16/2016     Typhoid, Inj, Inactive 11/13/2006     Typhoid, ViCPs 11/13/2006            Electronically signed by Saurabh Parra MD 01/15/19 12:49 PM

## 2021-06-26 NOTE — PROGRESS NOTES
Progress Notes by Saurabh Parra MD at 12/6/2018  8:40 AM     Author: Saurabh Parra MD Service: -- Author Type: Physician    Filed: 12/6/2018  6:45 PM Encounter Date: 12/6/2018 Status: Signed    : Saurabh Parra MD (Physician)       Preoperative Exam    Scheduled Procedure: Total hip replacement left initially then right in late January   Surgery Date:  12/19/18  Surgery Location: [facility]#[phone]   Surgeon:  Dr. Carroll Mauricio    Assessment/Plan:     1. Preoperative evaluation to rule out surgical contraindication  - Comprehensive Metabolic Panel  - HM2(CBC w/o Differential)    2. Osteoarthritis of both hips, unspecified osteoarthritis type  Patient for left hip arthroplasty surgery, will  Also have the right hip replaced.    Surgical Procedure Risk: Low (reported cardiac risk generally < 1%)  Have you had prior anesthesia?: Yes  Have you or any family members had a previous anesthesia reaction:  No  Do you or any family members have a history of a clotting or bleeding disorder?: No  Cardiac Risk Assessment: no increased risk for major cardiac complications    Reviewed risks (not limited to bleeding,infection,pain,un-anticipated response to anesthesia ecc) and benefits (diagnostic and therapeutic) of surgery with patient, he understands the risks of the procedure and would like to proceed.  Patient's active problems diagnostically and therapeutically optimized for planned procedure with, Local or General anesthesia    Functional Status: Independent  Patient plans to recover at home with family.     Subjective:      Andrew Benito is a 43 y.o. male who presents for a preoperative consultation.  Progressive degenerative hips disease, worsening over the past few years, patient has tried and failed physical therapy, steroid injection therapy and other conservative management.  Has seen a couple of orthopedic surgeon for second opinion,  based on his  current symptoms mostly pain and limitation, has decided to go for bilateral hip replacement..  Patient has seen orthopedist and deemed to be a good candidate, he is scheduled to have a left hip arthroplasty surgery on December 19, and right hip about a month later based on recovery from left hip.  Overall has been doing fine, does have mild intermittent asthma, only uses albuterol once a couple of times a year.    All other systems reviewed and are negative, other than those listed in the HPI.    Pertinent History  Do you have difficulty breathing or chest pain after walking up a flight of stairs: No  History of obstructive sleep apnea: No  Steroid use in the last 6 months: Yes: Hip injections early september  Frequent Aspirin/NSAID use: No  Prior Blood Transfusion: No  Prior Blood Transfusion Reaction: No  If for some reason prior to, during or after the procedure, if it is medically indicated, would you be willing to have a blood transfusion?:  There is no transfusion refusal.    Current Outpatient Medications   Medication Sig Dispense Refill   ? albuterol (PROVENTIL HFA;VENTOLIN HFA) 90 mcg/actuation inhaler Inhale 2 puffs every 6 (six) hours as needed for wheezing. 1 Inhaler 1     No current facility-administered medications for this visit.         Allergies   Allergen Reactions   ? Sulfa (Sulfonamide Antibiotics)        Patient Active Problem List   Diagnosis   ? Osteoarthritis of hips, bilateral       No past medical history on file.    Past Surgical History:   Procedure Laterality Date   ? RI REPAIR CRUCIATE LIGAMENT,KNEE      Description: Primary Repair Of Knee Ligament Cruciate Anterior;  Recorded: 10/12/2011;   ? RI REPAIR OF NASAL SEPTUM      Description: Septoplasty;  Recorded: 10/12/2011;       Social History     Socioeconomic History   ? Marital status:      Spouse name: Not on file   ? Number of children: Not on file   ? Years of education: Not on file   ? Highest education level: Not on file    Social Needs   ? Financial resource strain: Not on file   ? Food insecurity - worry: Not on file   ? Food insecurity - inability: Not on file   ? Transportation needs - medical: Not on file   ? Transportation needs - non-medical: Not on file   Occupational History   ? Not on file   Tobacco Use   ? Smoking status: Current Some Day Smoker   ? Smokeless tobacco: Never Used   ? Tobacco comment: 1 cig every 10 days   Substance and Sexual Activity   ? Alcohol use: Yes     Comment: Very rarely   ? Drug use: Not on file   ? Sexual activity: Not on file   Other Topics Concern   ? Not on file   Social History Narrative   ? Not on file       Patient Care Team:  Saurabh Parra MD as PCP - General (Family Medicine)          Objective:     Vitals:    12/06/18 0842   BP: 110/62   Pulse: 66   Resp: 12   Temp: 98  F (36.7  C)   TempSrc: Oral   SpO2: 98%   Weight: 166 lb (75.3 kg)   Height: 6' (1.829 m)         Physical Exam:  Physical Examination: General appearance - alert, well appearing, and in no distress  Mental status - alert, oriented to person, place, and time  Eyes - pupils equal and reactive, extraocular eye movements intact  Ears - bilateral TM's and external ear canals normal  Nose - normal and patent, no erythema, discharge or polyps  Mouth - mucous membranes moist, pharynx normal without lesions  Neck - supple, no significant adenopathy  Lymphatics - no palpable lymphadenopathy, no hepatosplenomegaly  Chest - clear to auscultation, no wheezes, rales or rhonchi, symmetric air entry  Heart - normal rate, regular rhythm, normal S1, S2, no murmurs, rubs, clicks or gallops  Abdomen - soft, nontender, nondistended, no masses or organomegaly  Back exam - full range of motion, no tenderness, palpable spasm or pain on motion  Neurological - alert, oriented, normal speech, no focal findings or movement disorder noted  Musculoskeletal -extremely limited internal, external rotation of both hips.  Limited flexion and  extension both hips.  Extremities - peripheral pulses normal, no pedal edema, no clubbing or cyanosis  Skin - normal coloration and turgor, no rashes, no suspicious skin lesions noted    Patient Instructions   Patient Education     Hip Fracture Surgery: Hospital Recovery  After surgery, youll be moved to the PACU (postanesthesia care unit). This is often called the recovery room. Youll stay there until youre fully awake and stable--often a few hours. Then youll be moved to your hospital room.  Right after surgery    After surgery, you may feel groggy, thirsty, or cold. Your throat may be sore. For a few days, you may also have:    An IV (intravenous) line to give you fluids and medicine.    A catheter (tube) to drain urine from your bladder.    Tubes to drain your incision.    Boots or special stockings on your legs. These help prevent blood clots.    An incentive spirometer (breathing device). Breathing through the spirometer helps prevent lung infections.  Pain control after surgery  You will likely have pain after surgery. To help you feel better, your nurse will give you pain medicine. You may also have a pump that lets you give yourself pain medicine. Even with medicine, some pain is normal. But tell your nurse if you are very uncomfortable. Be aware that some pain medicines can cause confusion or disorientation for a time. They can also cause constipation.  Starting hospital rehabilitation  Soon after surgery, a physical therapist (PT) will begin teaching you basic exercises. Youll also learn how to protect your hip while it heals. Even though youll have help, much of the work is up to you. So try to walk and move around as much as you safely can. It may seem hard at first. But its the best thing you can do for your recovery. The more active you are, the sooner youll be prepared to go home. Your healthcare provider may restrict weight-bearing activities after your surgery, depending on the type of fracture and  surgery that you had.  Date Last Reviewed: 10/1/2015    8677-6551 [employer]. [address] [place] [address], [place] [address]. All rights reserved. This information is not intended as a substitute for professional medical care. Always follow your healthcare professional's instructions.                 Labs:  Recent Results (from the past 120 hour(s))   Comprehensive Metabolic Panel    Collection Time: 12/06/18  9:12 AM   Result Value Ref Range    Sodium 143 136 - 145 mmol/L    Potassium 4.8 3.5 - 5.0 mmol/L    Chloride 104 98 - 107 mmol/L    CO2 25 22 - 31 mmol/L    Anion Gap, Calculation 14 5 - 18 mmol/L    Glucose 90 70 - 125 mg/dL    BUN 16 8 - 22 mg/dL    Creatinine 0.78 0.70 - 1.30 mg/dL    GFR MDRD Af Amer >60 >60 mL/min/1.73m2    GFR MDRD Non Af Amer >60 >60 mL/min/1.73m2    Bilirubin, Total 2.1 (H) 0.0 - 1.0 mg/dL    Calcium 10.4 8.5 - 10.5 mg/dL    Protein, Total 7.4 6.0 - 8.0 g/dL    Albumin 4.4 3.5 - 5.0 g/dL    Alkaline Phosphatase 68 45 - 120 U/L    AST 15 0 - 40 U/L    ALT 17 0 - 45 U/L   HM2(CBC w/o Differential)    Collection Time: 12/06/18  9:12 AM   Result Value Ref Range    WBC 4.9 4.0 - 11.0 thou/uL    RBC 5.08 4.40 - 6.20 mill/uL    Hemoglobin 15.7 14.0 - 18.0 g/dL    Hematocrit 47.4 40.0 - 54.0 %    MCV 93 80 - 100 fL    MCH 30.9 27.0 - 34.0 pg    MCHC 33.1 32.0 - 36.0 g/dL    RDW 11.7 11.0 - 14.5 %    Platelets 256 140 - 440 thou/uL    MPV 7.3 7.0 - 10.0 fL       Immunization History   Administered Date(s) Administered   ? DT (pediatric) 01/01/2001   ? Hep A, Adult IM (19yr & older) 11/13/2006   ? Hep A, historic 02/17/2009, 02/09/2011   ? Hepatitis A, Peds, Unspecified 11/13/2006   ? Influenza U1h9-50, 03/02/2010   ? Influenza, Live, Nasal LAIV3 10/23/2012   ? Influenza, inj, historic,unspecified 03/02/2010   ? Influenza, seasonal,quad inj 36+ mos 09/21/2015   ? Influenza, seasonal,quad inj 6-35 mos 10/12/2011, 02/06/2014   ? Td, Adult, Absorbed 12/09/1997   ? Tdap 11/09/2007,  09/16/2016   ? Typhoid, Inj, Inactive 11/13/2006   ? Typhoid, ViCPs 11/13/2006           Electronically signed by Saurabh Parra MD 12/06/18 8:22 AM

## 2021-07-31 ENCOUNTER — HEALTH MAINTENANCE LETTER (OUTPATIENT)
Age: 46
End: 2021-07-31

## 2022-02-17 ENCOUNTER — TRANSFERRED RECORDS (OUTPATIENT)
Dept: HEALTH INFORMATION MANAGEMENT | Facility: CLINIC | Age: 47
End: 2022-02-17
Payer: COMMERCIAL

## 2022-04-19 ENCOUNTER — TRANSFERRED RECORDS (OUTPATIENT)
Dept: HEALTH INFORMATION MANAGEMENT | Facility: CLINIC | Age: 47
End: 2022-04-19
Payer: COMMERCIAL

## 2022-08-27 ENCOUNTER — HEALTH MAINTENANCE LETTER (OUTPATIENT)
Age: 47
End: 2022-08-27

## 2023-08-22 ENCOUNTER — TRANSFERRED RECORDS (OUTPATIENT)
Dept: HEALTH INFORMATION MANAGEMENT | Facility: CLINIC | Age: 48
End: 2023-08-22
Payer: COMMERCIAL

## 2023-09-08 ENCOUNTER — TRANSFERRED RECORDS (OUTPATIENT)
Dept: HEALTH INFORMATION MANAGEMENT | Facility: CLINIC | Age: 48
End: 2023-09-08
Payer: COMMERCIAL

## 2023-09-23 ENCOUNTER — HEALTH MAINTENANCE LETTER (OUTPATIENT)
Age: 48
End: 2023-09-23

## 2025-01-30 ENCOUNTER — LAB (OUTPATIENT)
Dept: LAB | Facility: CLINIC | Age: 50
End: 2025-01-30
Payer: COMMERCIAL

## 2025-01-30 ENCOUNTER — VIRTUAL VISIT (OUTPATIENT)
Dept: FAMILY MEDICINE | Facility: CLINIC | Age: 50
End: 2025-01-30
Payer: COMMERCIAL

## 2025-01-30 DIAGNOSIS — J02.9 SORE THROAT: ICD-10-CM

## 2025-01-30 DIAGNOSIS — J02.9 SORE THROAT: Primary | ICD-10-CM

## 2025-01-30 LAB
FLUAV AG SPEC QL IA: POSITIVE
FLUBV AG SPEC QL IA: NEGATIVE

## 2025-01-30 RX ORDER — LEVETIRACETAM 1000 MG/1
1000 TABLET ORAL DAILY
COMMUNITY
Start: 2024-11-11

## 2025-01-30 ASSESSMENT — ENCOUNTER SYMPTOMS: FEVER: 1

## 2025-01-30 NOTE — PROGRESS NOTES
Allan is a 49 year old who is being evaluated via a billable video visit.    How would you like to obtain your AVS? Mail a copy  If the video visit is dropped, the invitation should be resent by: Text to cell phone: [phone]  Will anyone else be joining your video visit? No      Assessment & Plan     Sore throat  Suspect inluenza A or COVID. Discussed symptomatic cares. Outside flu treatment, within the COVID treatment window, but not high risk.   - COVID-19 Virus (Coronavirus) by PCR; Future  - Influenza A/B antigen; Future          Nicotine/Tobacco Cessation  He reports that he has been smoking. He has never used smokeless tobacco.  Nicotine/Tobacco Cessation Plan  Information offered: Patient not interested at this time            Subjective   Allan is a 49 year old, presenting for the following health issues:  Fever    Fever  Associated symptoms include a fever.   History of Present Illness       Reason for visit:  URI and fever  Symptom onset:  1-3 days ago  Symptoms include:  Fever, chill, cough,sore throat  Symptom intensity:  Severe  Symptom progression:  Worsening  Had these symptoms before:  No   He is taking medications regularly.     New patient. History updated.     Started on Monday night with a tickle in the throat.   The next day worsened with a mild headache and achiness and low energy.   Used some nyquil and Advil and Afrin Tuesday night.   Hard to sleep last night. Voice affected and throat hurting bad.                   Objective    Vitals - Patient Reported  Temperature (Patient Reported): 100.6  F (38.1  C)        Physical Exam   GENERAL: alert and no distress  EYES: Eyes grossly normal to inspection.  No discharge or erythema, or obvious scleral/conjunctival abnormalities.  RESP: No audible wheeze, cough, or visible cyanosis.    SKIN: Visible skin clear. No significant rash, abnormal pigmentation or lesions.  NEURO: Cranial nerves grossly intact.  Mentation and speech appropriate for  age.  PSYCH: Appropriate affect, tone, and pace of words          Video-Visit Details    Type of service:  Video Visit   Originating Location (pt. Location): Home    Distant Location (provider location):  On-site  Platform used for Video Visit: Carin  Signed Electronically by: Anais Stanley PA-C

## 2025-01-30 NOTE — LETTER
2025    Andrew Medellin   1975        To Whom it May Concern;    Please excuse Andrew Medellin from work from 25-25 due to illness.     Sincerely,        Anais Stanley PA-C

## 2025-01-30 NOTE — RESULT ENCOUNTER NOTE
Hi Allan,     It looks like Influenza A. I would recommend continuing your symptom management. We are seeing flu symptoms lasting about 5-7 days. Let me know if you need more time off of work.   Anais Stanley PA-C

## 2025-02-08 ENCOUNTER — HEALTH MAINTENANCE LETTER (OUTPATIENT)
Age: 50
End: 2025-02-08